# Patient Record
Sex: FEMALE | Race: WHITE
[De-identification: names, ages, dates, MRNs, and addresses within clinical notes are randomized per-mention and may not be internally consistent; named-entity substitution may affect disease eponyms.]

---

## 2023-05-30 ENCOUNTER — HOSPITAL ENCOUNTER (INPATIENT)
Dept: HOSPITAL 19 - LDRO | Age: 28
LOS: 1 days | Discharge: HOME | End: 2023-05-31
Attending: OBSTETRICS & GYNECOLOGY | Admitting: OBSTETRICS & GYNECOLOGY
Payer: OTHER GOVERNMENT

## 2023-05-30 VITALS — SYSTOLIC BLOOD PRESSURE: 135 MMHG | HEART RATE: 75 BPM | TEMPERATURE: 97.5 F | DIASTOLIC BLOOD PRESSURE: 76 MMHG

## 2023-05-30 VITALS — HEART RATE: 64 BPM | TEMPERATURE: 97.6 F | SYSTOLIC BLOOD PRESSURE: 121 MMHG | DIASTOLIC BLOOD PRESSURE: 75 MMHG

## 2023-05-30 VITALS — HEART RATE: 80 BPM | DIASTOLIC BLOOD PRESSURE: 65 MMHG | SYSTOLIC BLOOD PRESSURE: 146 MMHG

## 2023-05-30 VITALS — HEART RATE: 82 BPM | TEMPERATURE: 98 F | SYSTOLIC BLOOD PRESSURE: 97 MMHG | DIASTOLIC BLOOD PRESSURE: 54 MMHG

## 2023-05-30 VITALS — DIASTOLIC BLOOD PRESSURE: 73 MMHG | SYSTOLIC BLOOD PRESSURE: 121 MMHG | HEART RATE: 62 BPM

## 2023-05-30 VITALS — DIASTOLIC BLOOD PRESSURE: 70 MMHG | SYSTOLIC BLOOD PRESSURE: 139 MMHG | HEART RATE: 57 BPM

## 2023-05-30 VITALS — DIASTOLIC BLOOD PRESSURE: 67 MMHG | SYSTOLIC BLOOD PRESSURE: 127 MMHG | HEART RATE: 57 BPM

## 2023-05-30 VITALS — DIASTOLIC BLOOD PRESSURE: 80 MMHG | HEART RATE: 62 BPM | SYSTOLIC BLOOD PRESSURE: 139 MMHG

## 2023-05-30 VITALS — SYSTOLIC BLOOD PRESSURE: 137 MMHG | HEART RATE: 69 BPM | DIASTOLIC BLOOD PRESSURE: 61 MMHG

## 2023-05-30 VITALS — DIASTOLIC BLOOD PRESSURE: 60 MMHG | HEART RATE: 82 BPM | SYSTOLIC BLOOD PRESSURE: 133 MMHG

## 2023-05-30 VITALS — DIASTOLIC BLOOD PRESSURE: 77 MMHG | HEART RATE: 88 BPM | SYSTOLIC BLOOD PRESSURE: 136 MMHG

## 2023-05-30 VITALS — HEART RATE: 73 BPM

## 2023-05-30 VITALS — SYSTOLIC BLOOD PRESSURE: 128 MMHG | HEART RATE: 93 BPM | DIASTOLIC BLOOD PRESSURE: 79 MMHG

## 2023-05-30 VITALS — WEIGHT: 251.55 LBS | HEIGHT: 67.99 IN | BODY MASS INDEX: 38.12 KG/M2

## 2023-05-30 VITALS — TEMPERATURE: 97.7 F | DIASTOLIC BLOOD PRESSURE: 80 MMHG | SYSTOLIC BLOOD PRESSURE: 148 MMHG | HEART RATE: 74 BPM

## 2023-05-30 VITALS — SYSTOLIC BLOOD PRESSURE: 142 MMHG | HEART RATE: 62 BPM | DIASTOLIC BLOOD PRESSURE: 86 MMHG

## 2023-05-30 VITALS — TEMPERATURE: 97.8 F | HEART RATE: 59 BPM | DIASTOLIC BLOOD PRESSURE: 65 MMHG | SYSTOLIC BLOOD PRESSURE: 128 MMHG

## 2023-05-30 VITALS — HEART RATE: 75 BPM | SYSTOLIC BLOOD PRESSURE: 122 MMHG | DIASTOLIC BLOOD PRESSURE: 59 MMHG

## 2023-05-30 VITALS — SYSTOLIC BLOOD PRESSURE: 136 MMHG | DIASTOLIC BLOOD PRESSURE: 76 MMHG | HEART RATE: 82 BPM

## 2023-05-30 VITALS — SYSTOLIC BLOOD PRESSURE: 137 MMHG | DIASTOLIC BLOOD PRESSURE: 73 MMHG | HEART RATE: 59 BPM

## 2023-05-30 VITALS — SYSTOLIC BLOOD PRESSURE: 121 MMHG | DIASTOLIC BLOOD PRESSURE: 75 MMHG | HEART RATE: 75 BPM

## 2023-05-30 VITALS — SYSTOLIC BLOOD PRESSURE: 133 MMHG | HEART RATE: 97 BPM | DIASTOLIC BLOOD PRESSURE: 59 MMHG

## 2023-05-30 VITALS — HEART RATE: 68 BPM

## 2023-05-30 VITALS — SYSTOLIC BLOOD PRESSURE: 141 MMHG | DIASTOLIC BLOOD PRESSURE: 63 MMHG | HEART RATE: 80 BPM

## 2023-05-30 DIAGNOSIS — Z3A.39: ICD-10-CM

## 2023-05-30 DIAGNOSIS — Z23: ICD-10-CM

## 2023-05-30 LAB
BASOPHILS # BLD: 0 K/MM3 (ref 0–0.2)
BASOPHILS NFR BLD AUTO: 0.3 % (ref 0–2)
EOSINOPHIL # BLD: 0.1 K/MM3 (ref 0–0.7)
EOSINOPHIL NFR BLD: 1 % (ref 0–4)
ERYTHROCYTE [DISTWIDTH] IN BLOOD BY AUTOMATED COUNT: 13.2 % (ref 11.5–14.5)
GRANULOCYTES # BLD AUTO: 65.1 % (ref 42.2–75.2)
HCT VFR BLD AUTO: 36.1 % (ref 37–47)
HGB BLD-MCNC: 12.1 G/DL (ref 12.5–16)
LYMPHOCYTES # BLD: 2.9 K/MM3 (ref 1.2–3.4)
LYMPHOCYTES NFR BLD: 25.1 % (ref 20–51)
MCH RBC QN AUTO: 28 PG (ref 27–31)
MCHC RBC AUTO-ENTMCNC: 34 G/DL (ref 33–37)
MCV RBC AUTO: 82 FL (ref 80–100)
MONOCYTES # BLD: 0.9 K/MM3 (ref 0.1–0.6)
MONOCYTES NFR BLD AUTO: 7.8 % (ref 1.7–9.3)
NEUTROPHILS # BLD: 7.5 K/MM3 (ref 1.4–6.5)
PLATELET # BLD AUTO: 207 K/MM3 (ref 130–400)
PMV BLD AUTO: 12.7 FL (ref 7.4–10.4)
RBC # BLD AUTO: 4.38 M/MM3 (ref 4.1–5.3)

## 2023-05-30 PROCEDURE — 0UQMXZZ REPAIR VULVA, EXTERNAL APPROACH: ICD-10-PCS | Performed by: OBSTETRICS & GYNECOLOGY

## 2023-05-30 NOTE — NUR
Consents signed, witnessed, and obtained. Pt birth plan discussed.
Pt educated on protocol for urinary catheter use and what it is for. Pt
educated on unit IV saline lock. Pt educated on use of Pitocin, labor and
postpartum wise. Pt states, "I do not want Pitocin at all. I do not want to be
on Pitocin and then have to get the epidural because that leads to postpartum
depression. And I already have depression, so I would like to avoid that
please". Pitocin education reiterated and emphasis on Pitcoin use postpartum
wise reiterated. Pt stated "Ok, that is fine. I am ok with that".
Pt educated on birth preference of wanting to push without
time limit. Pt educated on the use of forceps/vacuums.
 
Pt educated on epidural and epidural procedure. Pt states, "I would like to go
without, for as long as I can". This nurse clarified pt birth preference of
"no IV pain relief". Pt states, "I would like for IV pain meds to be tried
first before an epidural. I am ok with that". Pt educated on facility protocol
of "gas". Pt educated on being on clear/liquid diet. Pt educated on consent
forms, to include consent for donation, for circumcision on pt baby.
 
Pt educated on postpartum education packet. Questions, concerns, and needs
encouraged. Pt verbalized understanding of POC with "no" questions, concerns,
or needs.

## 2023-05-30 NOTE — NUR
FHR and ctx tracing intermittently due to pt positioning, utilizing BB. This
nurse at pt bedside palpating pt abd, attempting to readjust external monitors
x2.

## 2023-05-30 NOTE — NUR
100 PT MOANING. THIS RN IN ROOM CURRENTLY. PT REPORTS "A LOT OF PRESSURE" SVE
AT THIS TIME 9/100/0. PT TOLERATED WELL.

## 2023-05-30 NOTE — NUR
PTS FATHER WALKED OUT OF ROOM STATING PT PASSED OUT. STAFF IN ROOM NOW. PT ON
TOILET. STABLE BUT TIRED. MARCELA STEADY IN ROOM. PT MOVED TO BED. BEFORE GETTING
INTO BED, PT PASSED OUT AGAIN. STAFF BACK IN ROOM TO ASSIST PT BACK TO BED.
INSTRUCTED PT NOT TO GET UP WITH OUT NOTIFYING STAFF.

## 2023-05-30 NOTE — NUR
0030: Pt ambulated onto unit floor, accompanied by pt spouse, oriented to
LDR3. Pt changed into exam gown.
 
0034: This nurse at pt bedside for introductions and POC discussions. Pt on
external monitors x2. Pt states " I heard a loud pop with a small clear gush
and have been slowly leaking at 2300. I have been having ctx about every 2
minutes". VS and assessments obtained. Amnioswab conducted, positive for BECKY.
 
0045: SVE, with pt consent and explanation, 3/70/-2.
 
[id number]: Pt off external monitors x2, OOB ambulating to utilize bathroom.
 
0049: Pt back on external monitors x2. This nurse palpating pt abd, with pt
consent and explanation, attemtpting to readjust external monitors x2.

## 2023-05-30 NOTE — NUR
FHR tracing intermittently due to pt position, utilizing BB. FHR audible
throughout room. This nurse at pt bedside palpating pt abd, with pt consent
and explanation, attempting to readjust external monitors x2.

## 2023-05-30 NOTE — NUR
FHR and ctx tracing intermittently due to pt position, utilizing BB. FHR
audible throughout room. This nurse at pt bedside palpating pt abd, with pt
consent and exxplanation, attempting to readjust external monitors x2.
 
0320: SVE, with pt consent and explanation, 3-4/70/-2. POC updated and
discussed with pt and pt spouse.

## 2023-05-30 NOTE — NUR
PT MOANING IN PAIN. DR. SULLIVAN IN ROOM. PT SVE COMPLETE AT THIS TIME. ROOM
SET FOR DELIVERY, APPROPRIATE STAFF NOTIFIED.
 
1156 SMALL BAG OF WATER NOTED, AROM AT THIS TIME. THIN MEC FLUID. PT PUSHING.
 
1211  OF VIABLE MALE INFANT PER DR. SULLIVAN. INFANT PLACED ON MATERNAL
ABDOMEN AND CARE ASSUMED BY NURSERY RN.
 
1216  OF PLACENTA PER DR. SULLIVAN. PT TOLERATED FAIR. FUNDUS FIRM, AT U.
[facility] WNL. RIGHT LABIAL LACERATION NOTED. DR. SULLIVAN INJECTED LIDOCAINE THEN
REPAIRED. PT TOLERATED FAIR. QBL 200MLS.
 
ROOM PUT BACK TOGETHER, ICE PACK PLACED UNDER BOTTOM. [facility] WNL.

## 2023-05-30 NOTE — NUR
PT  TO NURSES STATION REQUESTING RN IN ROOM. MOTHER REPORTS INCREASE IN
RECTAL PRESSURE AND FELT NEED TO PUSH. THIS RN IN ROOM FOR SVE OF 6/100/-1. PT
TOLERATED WELL. INFORMED PT THAT THIS PRESSURE WILL BE CONSISTENT BUT IF
INCREASES TO LET RN KNOW.

## 2023-05-30 NOTE — NUR
PT REPORTS RECTAL PRESSURE WITH CTX. SVE AT THIS TIME 6/100/-1. PT TOLERATED
WELL. TOLD PT TO CALL RN IF PRESSURE IS CONSISTENT OR INTENSIFIES. PT
AGREEABLE.

## 2023-05-30 NOTE — NUR
0640 THIS RN IN ROOM TO DISCUSS POC WITH PT AND UPDATE WHITEBOARD. PT HOOKED
UP TO WIRELESS MONITOR FOR 20 MINUTE EFM. DIFFICULTY TRACING DUE TO ANTERIOR
PLACENTA. WILL TAKE PT OFF AT 0712.

## 2023-05-30 NOTE — NUR
0114: This nurse notified . Roles of pt progress. See physician notification
for further details.
 
0130: 18G to . Pt stated "I would like to be Heparin locked". No fluids
running at this time.

## 2023-05-30 NOTE — NUR
0348: This nurse notified  Roles of pt progress and pt decline of Pitocin.
See physician notification for further details.

## 2023-05-31 VITALS — TEMPERATURE: 97.7 F | HEART RATE: 96 BPM | SYSTOLIC BLOOD PRESSURE: 106 MMHG | DIASTOLIC BLOOD PRESSURE: 54 MMHG
